# Patient Record
Sex: FEMALE | Race: BLACK OR AFRICAN AMERICAN | NOT HISPANIC OR LATINO | URBAN - METROPOLITAN AREA
[De-identification: names, ages, dates, MRNs, and addresses within clinical notes are randomized per-mention and may not be internally consistent; named-entity substitution may affect disease eponyms.]

---

## 2021-02-12 ENCOUNTER — OFFICE VISIT (OUTPATIENT)
Dept: FAMILY MEDICINE | Facility: CLINIC | Age: 41
End: 2021-02-12

## 2021-02-12 VITALS
HEIGHT: 65 IN | HEART RATE: 84 BPM | DIASTOLIC BLOOD PRESSURE: 85 MMHG | TEMPERATURE: 97.4 F | SYSTOLIC BLOOD PRESSURE: 133 MMHG | OXYGEN SATURATION: 99 % | RESPIRATION RATE: 16 BRPM | WEIGHT: 162 LBS | BODY MASS INDEX: 26.99 KG/M2

## 2021-02-12 DIAGNOSIS — Z23 ENCOUNTER FOR IMMUNIZATION: ICD-10-CM

## 2021-02-12 DIAGNOSIS — Z23 NEED FOR PROPHYLACTIC VACCINATION AND INOCULATION AGAINST INFLUENZA: ICD-10-CM

## 2021-02-12 DIAGNOSIS — Z02.89 ENCOUNTER FOR COMPLETION OF FORM WITH PATIENT: Primary | ICD-10-CM

## 2021-02-12 DIAGNOSIS — Z11.1 SCREENING EXAMINATION FOR PULMONARY TUBERCULOSIS: ICD-10-CM

## 2021-02-12 DIAGNOSIS — Z01.84 IMMUNITY STATUS TESTING: ICD-10-CM

## 2021-02-12 PROCEDURE — 86765 RUBEOLA ANTIBODY: CPT | Performed by: PREVENTIVE MEDICINE

## 2021-02-12 PROCEDURE — 86706 HEP B SURFACE ANTIBODY: CPT | Performed by: PREVENTIVE MEDICINE

## 2021-02-12 PROCEDURE — 99000 SPECIMEN HANDLING OFFICE-LAB: CPT | Performed by: PREVENTIVE MEDICINE

## 2021-02-12 PROCEDURE — 90472 IMMUNIZATION ADMIN EACH ADD: CPT | Performed by: PREVENTIVE MEDICINE

## 2021-02-12 PROCEDURE — 86787 VARICELLA-ZOSTER ANTIBODY: CPT | Performed by: PREVENTIVE MEDICINE

## 2021-02-12 PROCEDURE — 86762 RUBELLA ANTIBODY: CPT | Performed by: PREVENTIVE MEDICINE

## 2021-02-12 PROCEDURE — 36415 COLL VENOUS BLD VENIPUNCTURE: CPT | Performed by: PREVENTIVE MEDICINE

## 2021-02-12 PROCEDURE — 86735 MUMPS ANTIBODY: CPT | Mod: 90 | Performed by: PREVENTIVE MEDICINE

## 2021-02-12 PROCEDURE — 90471 IMMUNIZATION ADMIN: CPT | Performed by: PREVENTIVE MEDICINE

## 2021-02-12 PROCEDURE — 90715 TDAP VACCINE 7 YRS/> IM: CPT | Performed by: PREVENTIVE MEDICINE

## 2021-02-12 PROCEDURE — 90686 IIV4 VACC NO PRSV 0.5 ML IM: CPT | Performed by: PREVENTIVE MEDICINE

## 2021-02-12 PROCEDURE — 99202 OFFICE O/P NEW SF 15 MIN: CPT | Mod: 25 | Performed by: PREVENTIVE MEDICINE

## 2021-02-12 PROCEDURE — 86481 TB AG RESPONSE T-CELL SUSP: CPT | Performed by: PREVENTIVE MEDICINE

## 2021-02-12 SDOH — HEALTH STABILITY: MENTAL HEALTH: HOW OFTEN DO YOU HAVE 6 OR MORE DRINKS ON ONE OCCASION?: NEVER

## 2021-02-12 SDOH — HEALTH STABILITY: MENTAL HEALTH: HOW OFTEN DO YOU HAVE A DRINK CONTAINING ALCOHOL?: NEVER

## 2021-02-12 SDOH — HEALTH STABILITY: MENTAL HEALTH: HOW MANY STANDARD DRINKS CONTAINING ALCOHOL DO YOU HAVE ON A TYPICAL DAY?: NOT ASKED

## 2021-02-12 ASSESSMENT — PAIN SCALES - GENERAL: PAINLEVEL: NO PAIN (0)

## 2021-02-12 ASSESSMENT — MIFFLIN-ST. JEOR: SCORE: 1395.71

## 2021-02-12 NOTE — NURSING NOTE
Prior to immunization administration, verified patients identity using patient s name and date of birth. Please see Immunization Activity for additional information.     Screening Questionnaire for Pediatric Immunization    Is the child sick today?   No   Does the child have allergies to medications, food, a vaccine component, or latex?   No   Has the child had a serious reaction to a vaccine in the past?   No   Does the child have a long-term health problem with lung, heart, kidney or metabolic disease (e.g., diabetes), asthma, a blood disorder, no spleen, complement component deficiency, a cochlear implant, or a spinal fluid leak?  Is he/she on long-term aspirin therapy?   No   If the child to be vaccinated is 2 through 4 years of age, has a healthcare provider told you that the child had wheezing or asthma in the  past 12 months?   No   If your child is a baby, have you ever been told he or she has had intussusception?   No   Has the child, sibling or parent had a seizure, has the child had brain or other nervous system problems?   No   Does the child have cancer, leukemia, AIDS, or any immune system         problem?   No   Does the child have a parent, brother, or sister with an immune system problem?   No   In the past 3 months, has the child taken medications that affect the immune system such as prednisone, other steroids, or anticancer drugs; drugs for the treatment of rheumatoid arthritis, Crohn s disease, or psoriasis; or had radiation treatments?   No   In the past year, has the child received a transfusion of blood or blood products, or been given immune (gamma) globulin or an antiviral drug?   No   Is the child/teen pregnant or is there a chance that she could become       pregnant during the next month?   No   Has the child received any vaccinations in the past 4 weeks?   No      Immunization questionnaire answers were all negative.        [place] given to patient.    Per  orders of Dr. Mcgee, injection of Tdap and flu shot given by Makayla Murcia MA. Patient instructed to remain in clinic for 15 minutes afterwards, and to report any adverse reaction to me immediately.    Screening performed by Makayla Murcia MA on 2/12/2021 at 4:54 PM.

## 2021-02-12 NOTE — PROGRESS NOTES
"    Assessment & Plan     Encounter for completion of form with patient  -Form reviewed    Immunity status testing  -await labs  - Mumps Immune Status, IgG  - Rubella Antibody IgG Quantitative  - Varicella Zoster Virus Antibody IgG  - Rubeola Antibody IgG  - Hepatitis B Surface Antibody  -if not immune then will need vaccines for MMR, Varicella and Hepatitis B series     Screening examination for pulmonary tuberculosis  - Quantiferon TB Gold Plus  -will need chest X ray if test is positive     Encounter for immunization  -Tdap done today     Need for prophylactic vaccination and inoculation against influenza  - INFLUENZA VACCINE IM > 6 MONTHS VALENT IIV4 [36593]      15 minutes spent on the date of the encounter doing chart review, history and exam, documentation and further activities as noted above       BMI:   Estimated body mass index is 26.96 kg/m  as calculated from the following:    Height as of this encounter: 1.651 m (5' 5\").    Weight as of this encounter: 73.5 kg (162 lb).       Return in about 4 weeks (around 3/12/2021) for Routine preventive, with me, in person.     Due for a Physical, will schedule at a later time     Carlie Mcgee MD MPH    [facility]    Sandip Arguello is a 40 year old who presents for the following health issues     HPI       Here for completion of form for school:  -needs forms for [employer]   -patient does not have any vaccination records with her  -No information available through [facility] or Care Everywhere     Needs to be screened for TB:    No unexplained hoarseness  No loss of appetite  No unexplained weight loss  No productive or prolonged cough  No bloody sputum  Np persistent fever over 100 F  No night sweats  No hemoptysis  No shortness of breath  No unexplained fatigue or weakness  No chest pain  No recurrent pneumonia  No unprotected exposure to a known TB patient      Review of Systems   Constitutional, HEENT, " "cardiovascular, pulmonary, gi and gu systems are negative, except as otherwise noted.      Objective    /85 (BP Location: Left arm, Patient Position: Sitting, Cuff Size: Adult Large)   Pulse 84   Temp 97.4  F (36.3  C) (Tympanic)   Resp 16   Ht 1.651 m (5' 5\")   Wt 73.5 kg (162 lb)   LMP  (LMP Unknown)   SpO2 99%   BMI 26.96 kg/m    Body mass index is 26.96 kg/m .  Physical Exam   GENERAL APPEARANCE: healthy, alert and no distress  EYES: Eyes grossly normal to inspection and conjunctivae and sclerae normal  RESP: lungs clear to auscultation - no rales, rhonchi or wheezes  CV: regular rates and rhythm, normal S1 S2, no S3 or S4 and no murmur, click or rub  ABDOMEN: soft, non-tender and no rebound or guarding   MS: extremities normal- no gross deformities noted and peripheral pulses normal  SKIN: no suspicious lesions or rashes  NEURO: Normal strength and tone, mentation intact and speech normal  PSYCH: mentation appears normal      No results found for this or any previous visit (from the past 24 hour(s)).      "

## 2021-02-12 NOTE — PATIENT INSTRUCTIONS
At [facility], we strive to deliver an exceptional experience to you, every time we see you. If you receive a survey, please complete it as we do value your feedback.  If you have MyChart, you can expect to receive results automatically within 24 hours of their completion.  Your provider will send a note interpreting your results as well.   If you do not have MyChart, you should receive your results in about a week by mail.    Your care team:                            Family Medicine Internal Medicine   MD Amos Tineo MD Shantel Branch-Fleming, MD Srinivasa Vaka, MD Katya Belousova, PATATYANA Novoa, APRN CNP    Nakul Alberto, MD Pediatrics   Wei Meredith, PATATYANA Ramsey, CNP MD Roula Abbott APRN CNP   MD Jenni Smith MD Deborah Mielke, MD Stephanie Garner, APRN [facility]      Clinic hours: Monday - Thursday 7 am-6 pm; Fridays 7 am-5 pm.   Urgent care: Monday - Friday 11 am-9 pm; Saturday and Sunday 9 am-5 pm.    Clinic: [phone]       [facility]: Monday - Thursday 8 am - 7 pm; Friday 8 am - 6 pm  [facility]: [phone]     Use [email or web address] for 24/7 diagnosis and treatment of dozens of conditions.

## 2021-02-12 NOTE — LETTER
February 23, 2021      Saundra Miles Corporal  [address]  [place] [address]        Dear ,    Blood test for Tuberculosis did not show any abnormalities.  Blood test shows that you are immune to (protected against) Measles, Mumps and Rubella from past vaccination.  You are not protected against Chicken pox and Hepatitis B and these vaccine series will need to be completed for your school.  Please schedule on Ancillary for these vaccines.     Resulted Orders   Mumps Immune Status, IgG   Result Value Ref Range    Mumps Antibody IgG 2.9 (H) 0.0 - 0.8 AI      Comment:      Positive, suggests prev. exposure and probable immunity  Antibody index (AI) values reflect qualitative changes in antibody   concentration that cannot be directly associated with clinical condition or   disease state.     Rubella Antibody IgG Quantitative   Result Value Ref Range    Rubella Antibody IgG Quantitative 50 IU/mL      Comment:      Positive.  Suggests previous exposure or immunization and probable immunity  Reference Range:    Unvaccinated Negative 0-7 IU/mL  Vaccinated or previous exposure Positive 10 IU/ml or greater     Varicella Zoster Virus Antibody IgG   Result Value Ref Range    Varicella Zoster Virus Antibody IgG 0.5 0.0 - 0.8 AI      Comment:      Negative, suggests no immunologic exposure.  Antibody index (AI) values reflect qualitative changes in antibody   concentration that cannot be directly associated with clinical condition or   disease state.     Rubeola Antibody IgG   Result Value Ref Range    Rubeola (Measles) Antibody IgG 4.3 (H) 0.0 - 0.8 AI      Comment:      Positive, suggests prev. exposure and probable immunity  Antibody index (AI) values reflect qualitative changes in antibody   concentration that cannot be directly associated with clinical condition or   disease state.     Hepatitis B Surface Antibody   Result Value Ref Range    Hepatitis B Surface Antibody 0.30 <8.00 m[IU]/mL       Comment:      Nonreactive, No antibody detected when the value is less than 8.00 m[IU]/mL.   Quantiferon TB Gold Plus   Result Value Ref Range    MTB Quantiferon Result Negative NEG^Negative      Comment:      No interferon gamma response to M.tuberculosis antigens was detected.   Infection with M.tuberculosis is unlikely, however a single negative result   does not exclude infection. In patients at high risk for infection, a second   test should be considered      TB1 Ag minus Nil 0.00 IU/mL    TB2 Ag minus Nil 0.00 IU/mL    Mitogen minus Nil 1.87 IU/mL    NIL Result 0.07 IU/mL       If you have any questions or concerns, please call the clinic at the number listed above.       Sincerely,      Carlie Mcgee MD

## 2021-02-14 LAB
GAMMA INTERFERON BACKGROUND BLD IA-ACNC: 0.07 IU/ML
HBV SURFACE AB SERPL IA-ACNC: 0.3 M[IU]/ML
M TB IFN-G CD4+ BCKGRND COR BLD-ACNC: 1.87 IU/ML
M TB TUBERC IFN-G BLD QL: NEGATIVE
MITOGEN IGNF BCKGRD COR BLD-ACNC: 0 IU/ML
MITOGEN IGNF BCKGRD COR BLD-ACNC: 0 IU/ML

## 2021-02-17 LAB
MEV IGG SER QL IA: 4.3 AI (ref 0–0.8)
MUV IGG SER QL IA: 2.9 AI (ref 0–0.8)
RUBV IGG SERPL IA-ACNC: 50 IU/ML
VZV IGG SER QL IA: 0.5 AI (ref 0–0.8)

## 2021-02-18 NOTE — RESULT ENCOUNTER NOTE
Please CALL patient:    Dear Saundra Miles Corporal,    Blood test for Tuberculosis did not show any abnormalities.  Blood test shows that you are immune to (protected against) Measles, Mumps and Rubella from past vaccination.  You are not protected against Chicken pox and Hepatitis B and these vaccine series will need to be completed for your school.  Please schedule on Ancillary for these vaccines.     Regards,    Carlie Mcgee MD MPH

## 2021-02-22 ENCOUNTER — ALLIED HEALTH/NURSE VISIT (OUTPATIENT)
Dept: NURSING | Facility: CLINIC | Age: 41
End: 2021-02-22

## 2021-02-22 DIAGNOSIS — Z23 NEED FOR VACCINATION: Primary | ICD-10-CM

## 2021-02-22 PROCEDURE — 90716 VAR VACCINE LIVE SUBQ: CPT

## 2021-02-22 PROCEDURE — 99207 PR NO CHARGE NURSE ONLY: CPT

## 2021-02-22 PROCEDURE — 90471 IMMUNIZATION ADMIN: CPT

## 2021-02-22 PROCEDURE — 90472 IMMUNIZATION ADMIN EACH ADD: CPT

## 2021-02-22 PROCEDURE — 90746 HEPB VACCINE 3 DOSE ADULT IM: CPT

## 2021-02-22 NOTE — NURSING NOTE
Prior to immunization administration, verified patients identity using patient s name and date of birth. Please see Immunization Activity for additional information.     Screening Questionnaire for Adult Immunization    Are you sick today?   No   Do you have allergies to medications, food, a vaccine component or latex?   No   Have you ever had a serious reaction after receiving a vaccination?   No   Do you have a long-term health problem with heart, lung, kidney, or metabolic disease (e.g., diabetes), asthma, a blood disorder, no spleen, complement component deficiency, a cochlear implant, or a spinal fluid leak?  Are you on long-term aspirin therapy?   No   Do you have cancer, leukemia, HIV/AIDS, or any other immune system problem?   No   Do you have a parent, brother, or sister with an immune system problem?   No   In the past 3 months, have you taken medications that affect  your immune system, such as prednisone, other steroids, or anticancer drugs; drugs for the treatment of rheumatoid arthritis, Crohn s disease, or psoriasis; or have you had radiation treatments?   No   Have you had a seizure, or a brain or other nervous system problem?   No   During the past year, have you received a transfusion of blood or blood    products, or been given immune (gamma) globulin or antiviral drug?   No   For women: Are you pregnant or is there a chance you could become       pregnant during the next month?   No   Have you received any vaccinations in the past 4 weeks?   No     Immunization questionnaire answers were all negative.        Injection of Varicella and Hep B given by Makayla Murcia MA. Patient instructed to remain in clinic for 15 minutes afterwards, and to report any adverse reaction to me immediately.       Screening performed by Makayla Murcia MA on 2/22/2021 at 11:07 AM.

## 2022-03-29 ENCOUNTER — TELEPHONE (OUTPATIENT)
Dept: FAMILY MEDICINE | Facility: CLINIC | Age: 42
End: 2022-03-29
Payer: COMMERCIAL

## 2022-03-29 NOTE — TELEPHONE ENCOUNTER
Reason for call:  Order   Order or referral being requested: lab   Reason for request: employment   Date needed: as soon as possible  Has the patient been seen by the PCP for this problem? YES    Additional comments: requesting orders for tb gold lab     Phone number to reach patient:  Home number on file [phone] (home)    Best Time:  Any     Can we leave a detailed message on this number?  YES    Travel screening: Not Applicable

## 2022-03-30 NOTE — TELEPHONE ENCOUNTER
Patient called and scheduled a video visit with Wei Meredith for 3/31/2022.  Oilve Jimenez  [facility]  2nd Floor  Primary Care

## 2022-03-30 NOTE — TELEPHONE ENCOUNTER
Called and spoke to the patient and gave her Wei's message. Patient understands.  Siria Pearl [facility]  2nd Floor  Primary Care

## 2022-03-30 NOTE — TELEPHONE ENCOUNTER
Patient needs appt.more than 1 year since last appt.   Can be virtual/evisit.    Wei Meredith PA-C

## 2022-03-31 ENCOUNTER — VIRTUAL VISIT (OUTPATIENT)
Dept: FAMILY MEDICINE | Facility: CLINIC | Age: 42
End: 2022-03-31
Payer: COMMERCIAL

## 2022-03-31 DIAGNOSIS — Z11.1 TUBERCULOSIS SCREENING: Primary | ICD-10-CM

## 2022-03-31 PROCEDURE — 99212 OFFICE O/P EST SF 10 MIN: CPT | Mod: 95 | Performed by: PHYSICIAN ASSISTANT

## 2022-03-31 NOTE — PATIENT INSTRUCTIONS
At [facility], we strive to deliver an exceptional experience to you, every time we see you. If you receive a survey, please complete it as we do value your feedback.  If you have MyChart, you can expect to receive results automatically within 24 hours of their completion.  Your provider will send a note interpreting your results as well.   If you do not have MyChart, you should receive your results in about a week by mail.    Your care team:                            Family Medicine Internal Medicine   MD Amos Tineo MD Shantel Branch-Fleming, MD Srinivasa Vaka, MD Katya Belousova, JTAIN Pritchard CNP, MD ([facility]) Pediatrics   Wei Meredith, MD Cony Harris MD Amelia Massimini APRN CNP Kim Thein, APRN CNP Bethany Templen, MD             Clinic hours: Monday - Thursday 7 am-6 pm; Fridays 7 am-5 pm.   Urgent care: Monday - Friday 10 am- 8 pm; Saturday and Sunday 9 am-5 pm.    Clinic: [phone]       [facility]: Monday - Thursday 8 am - 7 pm; Friday 8 am - 6 pm  [facility]: [phone]

## 2022-03-31 NOTE — PROGRESS NOTES
Patient is being evaluated via a billable telephone visit.           How would you like to obtain your AVS? Mail a copy      Assessment & Plan  sounds well  Problem List Items Addressed This Visit     None      Visit Diagnoses     Tuberculosis screening    -  Primary    Relevant Orders    Quantiferon TB Gold Plus             8 minutes spent on the date of the encounter doing chart review, history and exam, documentation and further activities per the note           Return in about 3 months (around 6/30/2022) for Virtual Visit.    NURIS Paige  [facility]    Subjective     Patient present to clinic today for the following health issues     HPI   Needs Tb screen for work, was negative last year  asymptomatic        Review of Systems   REsp no  cough      Objective       Vitals:  No vitals were obtained today due to virtual visit.    Physical Exam   healthy, alert and no distress  PSYCH: Alert and oriented times 3; coherent speech, normal   rate and volume, able to articulate logical thoughts, able   to abstract reason, no tangential thoughts, no hallucinations   or delusions  Her affect is normal  RESP: No cough, no audible wheezing, able to talk in full sentences  Remainder of exam unable to be completed due to telephone visits           Phone call duration: 4 minutes

## 2022-04-01 ENCOUNTER — LAB (OUTPATIENT)
Dept: LAB | Facility: CLINIC | Age: 42
End: 2022-04-01
Payer: COMMERCIAL

## 2022-04-01 DIAGNOSIS — Z11.1 TUBERCULOSIS SCREENING: ICD-10-CM

## 2022-04-01 PROCEDURE — 86481 TB AG RESPONSE T-CELL SUSP: CPT

## 2022-04-01 PROCEDURE — 36415 COLL VENOUS BLD VENIPUNCTURE: CPT

## 2022-04-03 LAB
GAMMA INTERFERON BACKGROUND BLD IA-ACNC: 0.05 IU/ML
M TB IFN-G BLD-IMP: NEGATIVE
M TB IFN-G CD4+ BCKGRND COR BLD-ACNC: 2.07 IU/ML
MITOGEN IGNF BCKGRD COR BLD-ACNC: 0 IU/ML
MITOGEN IGNF BCKGRD COR BLD-ACNC: 0 IU/ML
QUANTIFERON MITOGEN: 2.12 IU/ML
QUANTIFERON NIL TUBE: 0.05 IU/ML
QUANTIFERON TB1 TUBE: 0.05 IU/ML
QUANTIFERON TB2 TUBE: 0.05

## 2022-04-03 NOTE — RESULT ENCOUNTER NOTE
Please either mail negative results to patient or contact her to come pick them up at the front  Wei Meredith PA-C

## 2022-04-05 ENCOUNTER — TELEPHONE (OUTPATIENT)
Dept: FAMILY MEDICINE | Facility: CLINIC | Age: 42
End: 2022-04-05
Payer: COMMERCIAL

## 2022-06-29 ENCOUNTER — OFFICE VISIT (OUTPATIENT)
Dept: OPTOMETRY | Facility: CLINIC | Age: 42
End: 2022-06-29
Payer: COMMERCIAL

## 2022-06-29 DIAGNOSIS — H11.159 PINGUECULA, UNSPECIFIED LATERALITY: Primary | ICD-10-CM

## 2022-06-29 PROCEDURE — 99203 OFFICE O/P NEW LOW 30 MIN: CPT | Performed by: OPTOMETRIST

## 2022-06-29 RX ORDER — NEOMYCIN SULFATE, POLYMYXIN B SULFATE AND DEXAMETHASONE 3.5; 10000; 1 MG/ML; [USP'U]/ML; MG/ML
1-2 SUSPENSION/ DROPS OPHTHALMIC EVERY 4 HOURS
Qty: 5 ML | Refills: 1 | Status: SHIPPED | OUTPATIENT
Start: 2022-06-29

## 2022-06-29 ASSESSMENT — VISUAL ACUITY
OD_SC: 20/30
OS_SC: 20/20
METHOD: SNELLEN - LINEAR
OD_SC+: -1

## 2022-06-29 ASSESSMENT — CONF VISUAL FIELD
OD_NORMAL: 1
OS_NORMAL: 1

## 2022-06-29 ASSESSMENT — SLIT LAMP EXAM - LIDS
COMMENTS: NORMAL
COMMENTS: NORMAL

## 2022-06-29 ASSESSMENT — TONOMETRY
OS_IOP_MMHG: 16
OD_IOP_MMHG: 16
IOP_METHOD: APPLANATION

## 2022-06-29 ASSESSMENT — EXTERNAL EXAM - LEFT EYE: OS_EXAM: NORMAL

## 2022-06-29 ASSESSMENT — EXTERNAL EXAM - RIGHT EYE: OD_EXAM: NORMAL

## 2022-06-29 NOTE — LETTER
6/29/2022         RE: Saundra Hall  [address]  [place] [address]        Dear Colleague,    Thank you for referring your patient, Saundra Hall, to the [facility]. Please see a copy of my visit note below.    Chief Complaint   Patient presents with     Eye Pain     For the last 2 weeks, patient has been experiencing right eye pain, with discharge in the mornings, redness, itching and tearing. Has tried using clear eye eye drops and seems to help for a short time and after a couple hours it gets itchy and red again.        Do you wear contact lenses? no        Nadia Hallman - Optometric Assistant     See Review Of Systems       Medical, surgical and family histories reviewed and updated 6/29/2022.         OBJECTIVE: See Ophthalmology exam    ASSESSMENT:    ICD-10-CM    1. Pinguecula, unspecified laterality  H11.159 neomycin-polymyxin-dexamethasone (MAXITROL) 3.5-61648-7.1 SUSP ophthalmic susp      PLAN:    Patient Instructions   Maxitrol is the AB/ Steroid eyedrop prescribed to instill in the right eye 4 times a day.    RTC 2 weeks for followup/ eye pressure check.      Riddhi Awad O.D.  [facility]   [address]  [place] [address]    [phone]           Again, thank you for allowing me to participate in the care of your patient.        Sincerely,        Riddhi Awad, OD

## 2022-06-29 NOTE — PATIENT INSTRUCTIONS
Maxitrol is the AB/ Steroid eyedrop prescribed to instill in the right eye 4 times a day.    RTC 2 weeks for followup/ eye pressure check.      Riddhi Awad O.D.  [address] [address] [phone]

## 2022-06-29 NOTE — PROGRESS NOTES
Chief Complaint   Patient presents with     Eye Pain     For the last 2 weeks, patient has been experiencing right eye pain, with discharge in the mornings, redness, itching and tearing. Has tried using clear eye eye drops and seems to help for a short time and after a couple hours it gets itchy and red again.        Do you wear contact lenses? no        Nadia Hallman - Optometric Assistant     See Review Of Systems       Medical, surgical and family histories reviewed and updated 6/29/2022.         OBJECTIVE: See Ophthalmology exam    ASSESSMENT:    ICD-10-CM    1. Pinguecula, unspecified laterality  H11.159 neomycin-polymyxin-dexamethasone (MAXITROL) 3.5-53097-6.1 SUSP ophthalmic susp      PLAN:    Patient Instructions   Maxitrol is the AB/ Steroid eyedrop prescribed to instill in the right eye 4 times a day.    RTC 2 weeks for followup/ eye pressure check.      Riddhi Awad O.D.  [facility]   [address] [place] [address]    [phone]

## 2022-07-12 ENCOUNTER — OFFICE VISIT (OUTPATIENT)
Dept: OPTOMETRY | Facility: CLINIC | Age: 42
End: 2022-07-12
Payer: COMMERCIAL

## 2022-07-12 DIAGNOSIS — H11.159 PINGUECULA, UNSPECIFIED LATERALITY: Primary | ICD-10-CM

## 2022-07-12 PROCEDURE — 99213 OFFICE O/P EST LOW 20 MIN: CPT | Performed by: OPTOMETRIST

## 2022-07-12 RX ORDER — PREDNISOLONE ACETATE 10 MG/ML
1-2 SUSPENSION/ DROPS OPHTHALMIC
Qty: 18 ML | Refills: 0 | Status: SHIPPED | OUTPATIENT
Start: 2022-07-12 | End: 2022-08-11

## 2022-07-12 ASSESSMENT — TONOMETRY
OD_IOP_MMHG: 15
IOP_METHOD: APPLANATION
OS_IOP_MMHG: 16

## 2022-07-12 ASSESSMENT — EXTERNAL EXAM - LEFT EYE: OS_EXAM: NORMAL

## 2022-07-12 ASSESSMENT — SLIT LAMP EXAM - LIDS
COMMENTS: NORMAL
COMMENTS: NORMAL

## 2022-07-12 ASSESSMENT — VISUAL ACUITY
OS_SC+: -2
METHOD: SNELLEN - LINEAR
OS_SC: 20/25
OD_SC: 20/40

## 2022-07-12 ASSESSMENT — CONF VISUAL FIELD
OS_NORMAL: 1
OD_NORMAL: 1

## 2022-07-12 ASSESSMENT — EXTERNAL EXAM - RIGHT EYE: OD_EXAM: NORMAL

## 2022-07-12 NOTE — PATIENT INSTRUCTIONS
A steroid eyedrop has been prescribed to instill into both eyes 6 times per day while awake x 1 month.    RTC 1 month for an eye pressure check as some can get an elevated eye pressure with the steroid eyedrops.      Riddhi Awad O.D.  [address] [address]    [phone]

## 2022-07-12 NOTE — LETTER
7/12/2022         RE: Saundra Hall  [address]  [place] [address]        Dear Colleague,    Thank you for referring your patient, Saundra Hall, to the [facility]. Please see a copy of my visit note below.    Chief Complaint   Patient presents with     Eye Pain     The right eye is still experiencing pain but not as intense as before. Still using prescribed Maxitrol eye drops. After waking in the morning the right eye still feels sleepy with tension and starts tearing. Going into the evening, the right eye starts getting the tension feeling and pus starts forming in the eye.        Do you wear contact lenses? no        Nadia Hallman - Optometric Assistant     See Review Of Systems       Medical, surgical and family histories reviewed and updated 7/12/2022.         OBJECTIVE: See Ophthalmology exam    ASSESSMENT:    ICD-10-CM    1. Pinguecula, unspecified laterality  H11.159 prednisoLONE acetate (PRED FORTE) 1 % ophthalmic suspension      PLAN:    Patient Instructions   A steroid eyedrop has been prescribed to instill into both eyes 6 times per day while awake x 1 month.    RTC 1 month for an eye pressure check as some can get an elevated eye pressure with the steroid eyedrops.      Riddhi Awad O.D.  [facility]   [address]  [place] [address]    [phone]           Again, thank you for allowing me to participate in the care of your patient.        Sincerely,        Riddhi Awad, OD

## 2022-07-12 NOTE — PROGRESS NOTES
Chief Complaint   Patient presents with     Eye Pain     The right eye is still experiencing pain but not as intense as before. Still using prescribed Maxitrol eye drops. After waking in the morning the right eye still feels sleepy with tension and starts tearing. Going into the evening, the right eye starts getting the tension feeling and pus starts forming in the eye.        Do you wear contact lenses? no        Nadia Hallman - Optometric Assistant     See Review Of Systems       Medical, surgical and family histories reviewed and updated 7/12/2022.         OBJECTIVE: See Ophthalmology exam    ASSESSMENT:    ICD-10-CM    1. Pinguecula, unspecified laterality  H11.159 prednisoLONE acetate (PRED FORTE) 1 % ophthalmic suspension      PLAN:    Patient Instructions   A steroid eyedrop has been prescribed to instill into both eyes 6 times per day while awake x 1 month.    RTC 1 month for an eye pressure check as some can get an elevated eye pressure with the steroid eyedrops.      Riddhi Awad O.D.  [address] [address]    [phone]

## 2022-08-16 ENCOUNTER — OFFICE VISIT (OUTPATIENT)
Dept: OPTOMETRY | Facility: CLINIC | Age: 42
End: 2022-08-16
Payer: COMMERCIAL

## 2022-08-16 DIAGNOSIS — H11.159 PINGUECULA, UNSPECIFIED LATERALITY: Primary | ICD-10-CM

## 2022-08-16 PROCEDURE — 99213 OFFICE O/P EST LOW 20 MIN: CPT | Performed by: OPTOMETRIST

## 2022-08-16 ASSESSMENT — TONOMETRY
IOP_METHOD: TONOPEN
OS_IOP_MMHG: 17
OD_IOP_MMHG: 17

## 2022-08-16 ASSESSMENT — SLIT LAMP EXAM - LIDS
COMMENTS: NORMAL
COMMENTS: NORMAL

## 2022-08-16 ASSESSMENT — EXTERNAL EXAM - RIGHT EYE: OD_EXAM: NORMAL

## 2022-08-16 ASSESSMENT — VISUAL ACUITY
OD_SC: 20/40
OS_SC+: -1
METHOD: SNELLEN - LINEAR
OD_SC+: -1
OS_SC: 20/20

## 2022-08-16 ASSESSMENT — EXTERNAL EXAM - LEFT EYE: OS_EXAM: NORMAL

## 2022-08-16 NOTE — PROGRESS NOTES
"Chief Complaint   Patient presents with     Eye Problem Right Eye     Here for pressure check. PT says there is no improvement in the right eye. There is no pain, but still having stringy discharge in right eye. Right eye feels dry. If she doesn't put the eye drops in eye, then PT doesn't experience the stringy eye.                Nadia Hallman - Optometric Assistant     See Review Of Systems       Medical, surgical and family histories reviewed and updated 8/16/2022.         OBJECTIVE: See Ophthalmology exam    ASSESSMENT:    ICD-10-CM    1. Pinguecula, unspecified laterality - Both Eyes  H11.159       PLAN:    Patient Instructions   A pterygium (zji-VRA-ch-uhm) is an elevated, wedged-shaped bump on the eyeball that starts on the white of the eye (sclera) and can invade the cornea. If you have more than one of these eye growths, the plural form of the word is pterygia (oxh-OPU-ar-ah).    Though it's commonly called \"surfer's eye,\" you don't have to be a surfer or ever see the ocean to get a pterygium. But being in bright sunlight for long hours -- especially when you are on water, which reflects the sun's harmful UV rays -- increases your risk.    Pterygia are benign (non-cancerous) growths, but they can permanently disfigure the eye. They also can cause discomfort and blurry vision.    Causes  Although ultraviolet radiation from the sun appears to be the primary cause for the development and growth of pterygia, dust and wind are sometimes implicated too, as is dry eye disease.    Pterygia usually develop in 30- to 17-ezie-gkei, and these bumps on the eyeball rarely are seen in children. Having light skin and light eyes may put you at increased risk of getting a pterygium.    Signs And Symptoms  Pterygia usually occur on the side of the eye closer to the nose, but they can also develop on the side closer to the ear as well and can affect one eye or both eyes.    Many people with mild surfer's eye may not " "experience symptoms or require treatment. But large or growing pterygia often cause a gritty, itchy or burning sensation or the feeling something is \"in\" the eye (called a foreign body sensation). Also, these pterygia often become inflamed, causing unattractive red eyes.    If a pterygium significantly invades the cornea, it can distort the shape of the front surface of the eye, causing astigmatism and higher-order aberrations that affect vision.    Sometimes people confuse pterygia with eye growths called pingueculae, but they are different. Learn more about what a pinguecula is.    Pterygium Treatment  Treatment of surfer's eye depends on the size of the pterygium, whether it is growing and the symptoms it causes. Regardless of severity, pterygia should be monitored to prevent scarring that could lead to vision loss.    If a pterygium is small, your eye doctor may prescribe lubricants or a mild steroid eye drop to temporarily reduce swelling and redness. Contact lenses are sometimes used to cover the growth, protecting it from some of the effects of dryness or potentially from further UV exposure. Topical cyclosporine also may be prescribed for dry eye.    Even when there is no effect on vision, some people are bothered by the cosmetic appearance.  Surgery can warranted for both visual and cosmetic reasons.    If pterygium surgery is required, several surgical techniques are available. Your ophthalmologist who performs the procedure will determine the best technique for your specific needs.    Pterygium excision may be performed either in a room at the doctor's office or in an operating room. It's important to note that pterygium removal can induce astigmatism, especially in people who already have astigmatism.    Surgery for pterygium removal usually lasts no longer than 30 minutes, after which you likely will need to wear an eye patch for protection for a day or two. You should be able to return to work or " normal activities the next day.    Recurrence  Unfortunately, pterygia often return after surgical removal, possibly due to oxidative stress and/or continued UV exposure.    START THE TAPER OF THE AB/STEROID EYE DROP    INSTILL       1 DROP 3 TIMES PER DAY X 1 WEEK    THEN     1 DROP 2 TIMES PER DAY X 1 WEEK    THEN    1 DROP ONCE PER DAY X 1 WEEK    RTC 3 WEEKS FOR IOP CHECK    Riddhi Awad O.D.  [address] [address]    [phone]

## 2022-08-16 NOTE — PATIENT INSTRUCTIONS
"A pterygium (vqd-YEN-vs-uhm) is an elevated, wedged-shaped bump on the eyeball that starts on the white of the eye (sclera) and can invade the cornea. If you have more than one of these eye growths, the plural form of the word is pterygia (hil-ZQX-lx-ah).    Though it's commonly called \"surfer's eye,\" you don't have to be a surfer or ever see the ocean to get a pterygium. But being in bright sunlight for long hours -- especially when you are on water, which reflects the sun's harmful UV rays -- increases your risk.    Pterygia are benign (non-cancerous) growths, but they can permanently disfigure the eye. They also can cause discomfort and blurry vision.    Causes  Although ultraviolet radiation from the sun appears to be the primary cause for the development and growth of pterygia, dust and wind are sometimes implicated too, as is dry eye disease.    Pterygia usually develop in 30- to 96-odjq-iclf, and these bumps on the eyeball rarely are seen in children. Having light skin and light eyes may put you at increased risk of getting a pterygium.    Signs And Symptoms  Pterygia usually occur on the side of the eye closer to the nose, but they can also develop on the side closer to the ear as well and can affect one eye or both eyes.    Many people with mild surfer's eye may not experience symptoms or require treatment. But large or growing pterygia often cause a gritty, itchy or burning sensation or the feeling something is \"in\" the eye (called a foreign body sensation). Also, these pterygia often become inflamed, causing unattractive red eyes.    If a pterygium significantly invades the cornea, it can distort the shape of the front surface of the eye, causing astigmatism and higher-order aberrations that affect vision.    Sometimes people confuse pterygia with eye growths called pingueculae, but they are different. Learn more about what a pinguecula is.    Pterygium Treatment  Treatment of surfer's eye depends on the " size of the pterygium, whether it is growing and the symptoms it causes. Regardless of severity, pterygia should be monitored to prevent scarring that could lead to vision loss.    If a pterygium is small, your eye doctor may prescribe lubricants or a mild steroid eye drop to temporarily reduce swelling and redness. Contact lenses are sometimes used to cover the growth, protecting it from some of the effects of dryness or potentially from further UV exposure. Topical cyclosporine also may be prescribed for dry eye.    Even when there is no effect on vision, some people are bothered by the cosmetic appearance.  Surgery can warranted for both visual and cosmetic reasons.    If pterygium surgery is required, several surgical techniques are available. Your ophthalmologist who performs the procedure will determine the best technique for your specific needs.    Pterygium excision may be performed either in a room at the doctor's office or in an operating room. It's important to note that pterygium removal can induce astigmatism, especially in people who already have astigmatism.    Surgery for pterygium removal usually lasts no longer than 30 minutes, after which you likely will need to wear an eye patch for protection for a day or two. You should be able to return to work or normal activities the next day.    Recurrence  Unfortunately, pterygia often return after surgical removal, possibly due to oxidative stress and/or continued UV exposure.    START THE TAPER OF THE AB/STEROID EYE DROP    INSTILL       1 DROP 3 TIMES PER DAY X 1 WEEK    THEN     1 DROP 2 TIMES PER DAY X 1 WEEK    THEN    1 DROP ONCE PER DAY X 1 WEEK    RTC 3 WEEKS FOR IOP CHECK    Riddhi Awad O.D.  [address] [address]    [phone]

## 2022-08-16 NOTE — LETTER
"    8/16/2022         RE: Saundra Hall  [address]  [place] [address]        Dear Colleague,    Thank you for referring your patient, Saundra Hall, to the [facility]. Please see a copy of my visit note below.    Chief Complaint   Patient presents with     Eye Problem Right Eye     Here for pressure check. PT says there is no improvement in the right eye. There is no pain, but still having stringy discharge in right eye. Right eye feels dry. If she doesn't put the eye drops in eye, then PT doesn't experience the stringy eye.                Nadia Hallman - Optometric Assistant     See Review Of Systems       Medical, surgical and family histories reviewed and updated 8/16/2022.         OBJECTIVE: See Ophthalmology exam    ASSESSMENT:    ICD-10-CM    1. 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 If you have more than one of these eye growths, the plural form of the word is pterygia (bze-MUV-dk-ah).    Though it's commonly called \"surfer's eye,\" you don't have to be a surfer or ever see the ocean to get a pterygium. But being in bright sunlight for long hours -- especially when you are on water, which reflects the sun's harmful UV rays -- increases your risk.    Pterygia are benign (non-cancerous) growths, but they can permanently disfigure the eye. They also can cause discomfort and blurry vision.    Causes  Although ultraviolet radiation from the sun appears to be the primary cause for the development and growth of pterygia, dust and wind are sometimes implicated too, as is dry eye disease.    Pterygia usually develop in 30- to 75-xkac-xpdc, and these bumps on the eyeball rarely are seen in children. Having light skin and light eyes may put you at " "increased risk of getting a pterygium.    Signs And Symptoms  Pterygia usually occur on the side of the eye closer to the nose, but they can also develop on the side closer to the ear as well and can affect one eye or both eyes.    Many people with mild surfer's eye may not experience symptoms or require treatment. But large or growing pterygia often cause a gritty, itchy or burning sensation or the feeling something is \"in\" the eye (called a foreign body sensation). Also, these pterygia often become inflamed, causing unattractive red eyes.    If a pterygium significantly invades the cornea, it can distort the shape of the front surface of the eye, causing astigmatism and higher-order aberrations that affect vision.    Sometimes people confuse pterygia with eye growths called pingueculae, but they are different. Learn more about what a pinguecula is.    Pterygium Treatment  Treatment of surfer's eye depends on the size of the pterygium, whether it is growing and the symptoms it causes. Regardless of severity, pterygia should be monitored to prevent scarring that could lead to vision loss.    If a pterygium is small, your eye doctor may prescribe lubricants or a mild steroid eye drop to temporarily reduce swelling and redness. Contact lenses are sometimes used to cover the growth, protecting it from some of the effects of dryness or potentially from further UV exposure. Topical cyclosporine also may be prescribed for dry eye.    Even when there is no effect on vision, some people are bothered by the cosmetic appearance.  Surgery can warranted for both visual and cosmetic reasons.    If pterygium surgery is required, several surgical techniques are available. Your ophthalmologist who performs the procedure will determine the best technique for your specific needs.    Pterygium excision may be performed either in a room at the doctor's office or in an operating room. It's important to note that pterygium removal can " induce astigmatism, especially in people who already have astigmatism.    Surgery for pterygium removal usually lasts no longer than 30 minutes, after which you likely will need to wear an eye patch for protection for a day or two. You should be able to return to work or normal activities the next day.    Recurrence  Unfortunately, pterygia often return after surgical removal, possibly due to oxidative stress and/or continued UV exposure.    START THE TAPER OF THE AB/STEROID EYE DROP    INSTILL       1 DROP 3 TIMES PER DAY X 1 WEEK    THEN     1 DROP 2 TIMES PER DAY X 1 WEEK    THEN    1 DROP ONCE PER DAY X 1 WEEK    RTC 3 WEEKS FOR IOP CHECK    Riddhi Awad O.D.  [address] [address] [phone]           Again, thank you for allowing me to participate in the care of your patient.        Sincerely,        Riddhi Awad OD

## 2022-11-17 ENCOUNTER — OFFICE VISIT (OUTPATIENT)
Dept: OPTOMETRY | Facility: CLINIC | Age: 42
End: 2022-11-17
Payer: COMMERCIAL

## 2022-11-17 DIAGNOSIS — H11.001 PTERYGIUM OF RIGHT EYE: Primary | ICD-10-CM

## 2022-11-17 DIAGNOSIS — H11.159 PINGUECULA, UNSPECIFIED LATERALITY: ICD-10-CM

## 2022-11-17 PROCEDURE — 99213 OFFICE O/P EST LOW 20 MIN: CPT | Performed by: OPTOMETRIST

## 2022-11-17 RX ORDER — OLOPATADINE HYDROCHLORIDE 2 MG/ML
1 SOLUTION/ DROPS OPHTHALMIC DAILY
Qty: 2.5 ML | Refills: 11 | Status: SHIPPED | OUTPATIENT
Start: 2022-11-17

## 2022-11-17 ASSESSMENT — CONF VISUAL FIELD
OD_SUPERIOR_TEMPORAL_RESTRICTION: 0
OS_INFERIOR_NASAL_RESTRICTION: 0
OD_INFERIOR_TEMPORAL_RESTRICTION: 0
OD_NORMAL: 1
OS_INFERIOR_TEMPORAL_RESTRICTION: 0
OS_SUPERIOR_NASAL_RESTRICTION: 0
OD_SUPERIOR_NASAL_RESTRICTION: 0
OS_NORMAL: 1
OD_INFERIOR_NASAL_RESTRICTION: 0
OS_SUPERIOR_TEMPORAL_RESTRICTION: 0

## 2022-11-17 ASSESSMENT — TONOMETRY
OS_IOP_MMHG: 11
OD_IOP_MMHG: 11
IOP_METHOD: APPLANATION

## 2022-11-17 ASSESSMENT — VISUAL ACUITY
OS_SC: 20/20
OS_SC+: -1
METHOD: SNELLEN - LINEAR
OD_SC: 20/20

## 2022-11-17 ASSESSMENT — SLIT LAMP EXAM - LIDS
COMMENTS: NORMAL
COMMENTS: NORMAL

## 2022-11-17 ASSESSMENT — EXTERNAL EXAM - RIGHT EYE: OD_EXAM: NORMAL

## 2022-11-17 ASSESSMENT — EXTERNAL EXAM - LEFT EYE: OS_EXAM: NORMAL

## 2022-11-17 NOTE — PATIENT INSTRUCTIONS
"A pterygium (jey-ISQ-ai-uhm) is an elevated, wedged-shaped bump on the eyeball that starts on the white of the eye (sclera) and can invade the cornea. If you have more than one of these eye growths, the plural form of the word is pterygia (pkx-RPH-vh-ah).    Though it's commonly called \"surfer's eye,\" you don't have to be a surfer or ever see the ocean to get a pterygium. But being in bright sunlight for long hours -- especially when you are on water, which reflects the sun's harmful UV rays -- increases your risk.    Pterygia are benign (non-cancerous) growths, but they can permanently disfigure the eye. They also can cause discomfort and blurry vision.    Causes  Although ultraviolet radiation from the sun appears to be the primary cause for the development and growth of pterygia, dust and wind are sometimes implicated too, as is dry eye disease.    Pterygia usually develop in 30- to 99-amsr-zidz, and these bumps on the eyeball rarely are seen in children. Having light skin and light eyes may put you at increased risk of getting a pterygium.    Signs And Symptoms  Pterygia usually occur on the side of the eye closer to the nose, but they can also develop on the side closer to the ear as well and can affect one eye or both eyes.    Many people with mild surfer's eye may not experience symptoms or require treatment. But large or growing pterygia often cause a gritty, itchy or burning sensation or the feeling something is \"in\" the eye (called a foreign body sensation). Also, these pterygia often become inflamed, causing unattractive red eyes.    If a pterygium significantly invades the cornea, it can distort the shape of the front surface of the eye, causing astigmatism and higher-order aberrations that affect vision.    Sometimes people confuse pterygia with eye growths called pingueculae, but they are different. Learn more about what a pinguecula is.    Pterygium Treatment  Treatment of surfer's eye depends on the " size of the pterygium, whether it is growing and the symptoms it causes. Regardless of severity, pterygia should be monitored to prevent scarring that could lead to vision loss.    If a pterygium is small, your eye doctor may prescribe lubricants or a mild steroid eye drop to temporarily reduce swelling and redness. Contact lenses are sometimes used to cover the growth, protecting it from some of the effects of dryness or potentially from further UV exposure. Topical cyclosporine also may be prescribed for dry eye.    Even when there is no effect on vision, some people are bothered by the cosmetic appearance.  Surgery can warranted for both visual and cosmetic reasons.    If pterygium surgery is required, several surgical techniques are available. Your ophthalmologist who performs the procedure will determine the best technique for your specific needs.    Pterygium excision may be performed either in a room at the doctor's office or in an operating room. It's important to note that pterygium removal can induce astigmatism, especially in people who already have astigmatism.    Surgery for pterygium removal usually lasts no longer than 30 minutes, after which you likely will need to wear an eye patch for protection for a day or two. You should be able to return to work or normal activities the next day.    Recurrence  Unfortunately, pterygia often return after surgical removal, possibly due to oxidative stress and/or continued UV exposure.    DO NOT USE ANY GET THE RED OUT DROPS!    THEY CAUSE REBOUND HYPEREMIA     You have allergies that are affecting your eyes.  This can cause itching and tearing of the eyes.  I recommend allergy drops to be used daily for 4 months.  Then you can then use it when your eyes are bothering you or if there are certain times of the year when you know you will be affected.  Cold compresses can also make things more comfortable.  Try not to rub the eyes.  I recommend that you see your  primary care doctor or an allergist if you have other symptoms such as a runny nose or sneezing which has not been evaluated before or if your current medications don't seem to be helping.      Riddhi Awad O.D.  [address] [address]    [phone]

## 2022-11-17 NOTE — PROGRESS NOTES
"Chief Complaint   Patient presents with     Eye Problem Right Eye     Right eye is still getting red and there are still matter that gather in eye throughout the day. Tearing occasionally as well. When eye gets red, uses clear eye, and it makes it clear for a little bit and then goes back to red.        Do you wear contact lenses? og Hallman - Optometric Assistant     See Review Of Systems       Medical, surgical and family histories reviewed and updated 11/17/2022.         OBJECTIVE: See Ophthalmology exam    ASSESSMENT:    ICD-10-CM    1. Pinguecula, unspecified laterality - Both Eyes  H11.159          PLAN:    Patient Instructions   A pterygium (iqa-SOC-uo-uhm) is an elevated, wedged-shaped bump on the eyeball that starts on the white of the eye (sclera) and can invade the cornea. If you have more than one of these eye growths, the plural form of the word is pterygia (zzm-YZL-kx-ah).    Though it's commonly called \"surfer's eye,\" you don't have to be a surfer or ever see the ocean to get a pterygium. But being in bright sunlight for long hours -- especially when you are on water, which reflects the sun's harmful UV rays -- increases your risk.    Pterygia are benign (non-cancerous) growths, but they can permanently disfigure the eye. They also can cause discomfort and blurry vision.    Causes  Although ultraviolet radiation from the sun appears to be the primary cause for the development and growth of pterygia, dust and wind are sometimes implicated too, as is dry eye disease.    Pterygia usually develop in 30- to 84-pmlz-agrm, and these bumps on the eyeball rarely are seen in children. Having light skin and light eyes may put you at increased risk of getting a pterygium.    Signs And Symptoms  Pterygia usually occur on the side of the eye closer to the nose, but they can also develop on the side closer to the ear as well and can affect one eye or both eyes.    Many people with mild surfer's eye may " "not experience symptoms or require treatment. But large or growing pterygia often cause a gritty, itchy or burning sensation or the feeling something is \"in\" the eye (called a foreign body sensation). Also, these pterygia often become inflamed, causing unattractive red eyes.    If a pterygium significantly invades the cornea, it can distort the shape of the front surface of the eye, causing astigmatism and higher-order aberrations that affect vision.    Sometimes people confuse pterygia with eye growths called pingueculae, but they are different. Learn more about what a pinguecula is.    Pterygium Treatment  Treatment of surfer's eye depends on the size of the pterygium, whether it is growing and the symptoms it causes. Regardless of severity, pterygia should be monitored to prevent scarring that could lead to vision loss.    If a pterygium is small, your eye doctor may prescribe lubricants or a mild steroid eye drop to temporarily reduce swelling and redness. Contact lenses are sometimes used to cover the growth, protecting it from some of the effects of dryness or potentially from further UV exposure. Topical cyclosporine also may be prescribed for dry eye.    Even when there is no effect on vision, some people are bothered by the cosmetic appearance.  Surgery can warranted for both visual and cosmetic reasons.    If pterygium surgery is required, several surgical techniques are available. Your ophthalmologist who performs the procedure will determine the best technique for your specific needs.    Pterygium excision may be performed either in a room at the doctor's office or in an operating room. It's important to note that pterygium removal can induce astigmatism, especially in people who already have astigmatism.    Surgery for pterygium removal usually lasts no longer than 30 minutes, after which you likely will need to wear an eye patch for protection for a day or two. You should be able to return to work or " normal activities the next day.    Recurrence  Unfortunately, pterygia often return after surgical removal, possibly due to oxidative stress and/or continued UV exposure.    DO NOT USE ANY GET THE RED OUT DROPS!    THEY CAUSE REBOUND HYPEREMIA     You have allergies that are affecting your eyes.  This can cause itching and tearing of the eyes.  I recommend allergy drops to be used daily for 4 months.  Then you can then use it when your eyes are bothering you or if there are certain times of the year when you know you will be affected.  Cold compresses can also make things more comfortable.  Try not to rub the eyes.  I recommend that you see your primary care doctor or an allergist if you have other symptoms such as a runny nose or sneezing which has not been evaluated before or if your current medications don't seem to be helping.      Riddhi Awad O.D.  [address] [address]    [phone]

## 2022-11-17 NOTE — LETTER
"    11/17/2022         RE: Saundra Hall  [address]  [place] [address]        Dear Colleague,    Thank you for referring your patient, Saundra Hall, to the [facility]. Please see a copy of my visit note below.    Chief Complaint   Patient presents with     Eye Problem Right Eye     Right eye is still getting red and there are still matter that gather in eye throughout the day. Tearing occasionally as well. When eye gets red, uses clear eye, and it makes it clear for a little bit and then goes back to red.        Do you wear contact lenses? no        Nadia Hallman - Optometric Assistant     See Review Of Systems       Medical, surgical and family histories reviewed and updated 11/17/2022.         OBJECTIVE: See Ophthalmology exam    ASSESSMENT:    ICD-10-CM    1. Pinguecula, unspecified laterality - Both Eyes  H11.159          PLAN:    Patient Instructions   A pterygium ([id number]) is an elevated, wedged-shaped bump on the eyeball that starts on the white of the eye (sclera) and can invade the cornea. If you have more than one of these eye growths, the plural form of the word is pterygia ([id number]-IKK-pn-ah).    Though it's commonly called \"surfer's eye,\" you don't have to be a surfer or ever see the ocean to get a pterygium. But being in bright sunlight for long hours -- especially when you are on water, which reflects the sun's harmful UV rays -- increases your risk.    Pterygia are benign (non-cancerous) growths, but they can permanently disfigure the eye. They also can cause discomfort and blurry vision.    Causes  Although ultraviolet radiation from the sun appears to be the primary cause for the development and growth of pterygia, dust and wind are sometimes implicated too, as is dry eye disease.    Pterygia usually develop in 30- to [id number], and these bumps on the eyeball rarely are seen in children. Having light skin and light eyes may " "put you at increased risk of getting a pterygium.    Signs And Symptoms  Pterygia usually occur on the side of the eye closer to the nose, but they can also develop on the side closer to the ear as well and can affect one eye or both eyes.    Many people with mild surfer's eye may not experience symptoms or require treatment. But large or growing pterygia often cause a gritty, itchy or burning sensation or the feeling something is \"in\" the eye (called a foreign body sensation). Also, these pterygia often become inflamed, causing unattractive red eyes.    If a pterygium significantly invades the cornea, it can distort the shape of the front surface of the eye, causing astigmatism and higher-order aberrations that affect vision.    Sometimes people confuse pterygia with eye growths called pingueculae, but they are different. Learn more about what a pinguecula is.    Pterygium Treatment  Treatment of surfer's eye depends on the size of the pterygium, whether it is growing and the symptoms it causes. Regardless of severity, pterygia should be monitored to prevent scarring that could lead to vision loss.    If a pterygium is small, your eye doctor may prescribe lubricants or a mild steroid eye drop to temporarily reduce swelling and redness. Contact lenses are sometimes used to cover the growth, protecting it from some of the effects of dryness or potentially from further UV exposure. Topical cyclosporine also may be prescribed for dry eye.    Even when there is no effect on vision, some people are bothered by the cosmetic appearance.  Surgery can warranted for both visual and cosmetic reasons.    If pterygium surgery is required, several surgical techniques are available. Your ophthalmologist who performs the procedure will determine the best technique for your specific needs.    Pterygium excision may be performed either in a room at the doctor's office or in an operating room. It's important to note that pterygium " removal can induce astigmatism, especially in people who already have astigmatism.    Surgery for pterygium removal usually lasts no longer than 30 minutes, after which you likely will need to wear an eye patch for protection for a day or two. You should be able to return to work or normal activities the next day.    Recurrence  Unfortunately, pterygia often return after surgical removal, possibly due to oxidative stress and/or continued UV exposure.    DO NOT USE ANY GET THE RED OUT DROPS!    THEY CAUSE REBOUND HYPEREMIA     You have allergies that are affecting your eyes.  This can cause itching and tearing of the eyes.  I recommend allergy drops to be used daily for 4 months.  Then you can then use it when your eyes are bothering you or if there are certain times of the year when you know you will be affected.  Cold compresses can also make things more comfortable.  Try not to rub the eyes.  I recommend that you see your primary care doctor or an allergist if you have other symptoms such as a runny nose or sneezing which has not been evaluated before or if your current medications don't seem to be helping.      Riddhi Awad O.D.  [address] [address]    [phone]             Again, thank you for allowing me to participate in the care of your patient.        Sincerely,        Riddhi Awad OD